# Patient Record
(demographics unavailable — no encounter records)

---

## 2024-11-04 NOTE — PHYSICAL EXAM
[No Acute Distress] : no acute distress [PERRL] : pupils equal round and reactive to light [Normal TMs] : both tympanic membranes were normal [Supple] : supple [Clear to Auscultation] : lungs were clear to auscultation bilaterally [Regular Rhythm] : with a regular rhythm [No Edema] : there was no peripheral edema [Normal] : soft, non-tender, non-distended, no masses palpated, no HSM and normal bowel sounds [Normal Supraclavicular Nodes] : no supraclavicular lymphadenopathy [Normal Anterior Cervical Nodes] : no anterior cervical lymphadenopathy [de-identified] : left  wrist pain at base left thumb

## 2024-11-04 NOTE — HISTORY OF PRESENT ILLNESS
[FreeTextEntry1] : pt here for management of migraines and asthma  [de-identified] : pt had perforation of small intestine 5/22 also has lingula nodule pt  does not want to take statin taking cholestsure

## 2024-11-04 NOTE — ASSESSMENT
[FreeTextEntry1] : injection of depomedrol 1st left mp joing bp acceptable continue valsartan hld continue atorvastatin repeat ct of ches f/u on lungnodule lab evaluation

## 2025-02-17 NOTE — HISTORY OF PRESENT ILLNESS
[FreeTextEntry1] : pt here for management of anxiety and copd  migraines neuropathy  [de-identified] : pt very stressed just bought house has financial issues still working no migraines pt also c/o pain in base of left thumb

## 2025-02-17 NOTE — ASSESSMENT
[FreeTextEntry1] : polyneuropathy continue tramadol anxiety continue xanax increase [id number] mg tid for polyneuropathy hld continue atorvastatin

## 2025-02-17 NOTE — PHYSICAL EXAM
[No Acute Distress] : no acute distress [PERRL] : pupils equal round and reactive to light [Normal TMs] : both tympanic membranes were normal [Supple] : supple [Clear to Auscultation] : lungs were clear to auscultation bilaterally [Regular Rhythm] : with a regular rhythm [No Edema] : there was no peripheral edema [Normal] : soft, non-tender, non-distended, no masses palpated, no HSM and normal bowel sounds [Normal Supraclavicular Nodes] : no supraclavicular lymphadenopathy [Normal Anterior Cervical Nodes] : no anterior cervical lymphadenopathy [de-identified] : left  wrist pain at base left thumb

## 2025-02-20 NOTE — COUNSELING
[Nutrition/ Exercise/ Weight Management] : nutrition, exercise, weight management [Vitamins/Supplements] : vitamins/supplements [Breast Self Exam] : breast self exam [FreeTextEntry2] : Recommend vitamin D; adequate calcium intake, weight bearing exercises Fall precautions and fracture prevention discussed.

## 2025-02-20 NOTE — PHYSICAL EXAM
[Appropriately responsive] : appropriately responsive [Alert] : alert [No Acute Distress] : no acute distress [No Lymphadenopathy] : no lymphadenopathy [Regular Rate Rhythm] : regular rate rhythm [No Murmurs] : no murmurs [Soft] : soft [Non-tender] : non-tender [Non-distended] : non-distended [No HSM] : No HSM [No Lesions] : no lesions [No Mass] : no mass [Oriented x3] : oriented x3 [FreeTextEntry5] : Respirations even, unlabored. no dyspnea.  [FreeTextEntry6] : dense tissue bilaterally symmetric bilaterally, no palpable masses, no skin changes or dimpling, no nipple discharge, no adenopathy [FreeTextEntry1] : Labia/Clitoris: Normal atrophy, no lesions. Vagina: atrophic, no lesions visible or palpable. no abnormal discharge Cervix: Smooth, pink, no lesions. No cervical motion tenderness. PAP collected Uterus: normal size, mid-position, mobile, nontender. Adnexa: No palpable adnexal masses, nontender bilaterally.

## 2025-02-20 NOTE — HISTORY OF PRESENT ILLNESS
[Patient reported mammogram was normal] : Patient reported mammogram was normal [Patient reported PAP Smear was normal] : Patient reported PAP Smear was normal [Patient reported bone density results were normal] : Patient reported bone density results were normal [Patient reported colonoscopy was normal] : Patient reported colonoscopy was normal [LMP unknown] : LMP unknown [Y] : Positive pregnancy history [postmenopausal] : postmenopausal [N] : Patient is not sexually active [FreeTextEntry1] : 64-year-old female is here as a new patient for a routine gynecological exam. Denies PMB, pelvic or acute breast concerns.  [Mammogramdate] : 5/2024 [PapSmeardate] : 1996 [BoneDensityDate] : 02/2023 [TextBox_37] : osteopenia; managed by PCP [ColonoscopyDate] : 09/2022 [LMPDate] : ~ age 45yr [PGHxTotal] : 2 [[facility]] : 1 [PGHxAbortions] : 1 [[facility]] : 1

## 2025-02-20 NOTE — DISCUSSION/SUMMARY
[FreeTextEntry1] : Well appearing  female is here for gyn exam, no acute gyn complaints. no PMB. Hx of osteopenia, sent for Dexa by PCP. reviewed bone health.  - PAP done with co-testing (pt has not had pap in over 25 years) - UTD with mammogram - UTD with colon cancer screening - Healthy diet and routine weight bearing exercises recommended -SBE advised   RTO in 1 year for Annual gyn exam and PRN

## 2025-02-20 NOTE — HISTORY OF PRESENT ILLNESS
[Patient reported mammogram was normal] : Patient reported mammogram was normal [Patient reported PAP Smear was normal] : Patient reported PAP Smear was normal [Patient reported bone density results were normal] : Patient reported bone density results were normal [Patient reported colonoscopy was normal] : Patient reported colonoscopy was normal [LMP unknown] : LMP unknown [Y] : Positive pregnancy history [postmenopausal] : postmenopausal [N] : Patient is not sexually active [FreeTextEntry1] : 64-year-old female is here as a new patient for a routine gynecological exam. Denies PMB, pelvic or acute breast concerns.  [Mammogramdate] : 5/2024 [PapSmeardate] : 1996 [BoneDensityDate] : 02/2023 [TextBox_37] : osteopenia; managed by PCP [ColonoscopyDate] : 09/2022 [LMPDate] : ~ age 45yr [PGHxTotal] : 2 [[facility] Center[facility]] : 1 [PGHxAbortions] : 1 [[facility] Centeriving] : 1 no

## 2025-05-20 NOTE — HISTORY OF PRESENT ILLNESS
[FreeTextEntry1] : annual wellness visit [de-identified] : Pt is a 61 yo female here for annual wellness visit. Pt works in [employer] and comes to [place] often. Pt is having stress and anxiety about money and moving back and forth; there is a lot going on. 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 Pt in 05/22 hospitalized in [place] with diverticulitis, had colostomy for months, then removed colostomy. Couldn't go back to work for a while (10 months). Pt is suposed to have surgery on her lung but she can not afford to be out of work and live on only savings. She states her breathing is fine. Pt having trigger finger of left thumb, was wondering about getting another cortisone injection (got one of her right thumb years ago and has helped tremendously). PMH: HTN, asthma, diverticulosis, anxiety/insomnia, neuropathy, chronic pain on tramadol, pulm nodule + endobronchial mass Consults: pulmonology, colorectal  tried to decrease  tramadol to tid and increase gabapentin but pain got worse pt also c/o  rt thumb pain

## 2025-05-20 NOTE — PHYSICAL EXAM
[No Acute Distress] : no acute distress [PERRL] : pupils equal round and reactive to light [Normal TMs] : both tympanic membranes were normal [Supple] : supple [Clear to Auscultation] : lungs were clear to auscultation bilaterally [Regular Rhythm] : with a regular rhythm [No Edema] : there was no peripheral edema [Normal] : soft, non-tender, non-distended, no masses palpated, no HSM and normal bowel sounds [Normal Supraclavicular Nodes] : no supraclavicular lymphadenopathy [Normal Anterior Cervical Nodes] : no anterior cervical lymphadenopathy [de-identified] : left  wrist pain at base left thumb

## 2025-05-20 NOTE — HEALTH RISK ASSESSMENT
[Very Good] : ~his/her~  mood as very good [Never (0 pts)] : Never (0 points) [No] : In the past 12 months have you used drugs other than those required for medical reasons? No [Little interest or pleasure doing things] : 1) Little interest or pleasure doing things [Feeling down, depressed, or hopeless] : 2) Feeling down, depressed, or hopeless [0] : 2) Feeling down, depressed, or hopeless: Not at all (0) [PHQ-2 Negative - No further assessment needed] : PHQ-2 Negative - No further assessment needed [Former] : Former [> 15 Years] : > 15 Years [Patient declined Retinal Exam] : Patient declined Retinal Exam. [Patient reported mammogram was normal] : Patient reported mammogram was normal [Patient reported bone density results were normal] : Patient reported bone density results were normal [Patient reported colonoscopy was normal] : Patient reported colonoscopy was normal [None] : None [With Family] : lives with family [# of Members in Household ___] :  household currently consist of [unfilled] member(s) [Employed] : employed [College] : College [Single] : single [# Of Children ___] : has [unfilled] children [Feels Safe at Home] : Feels safe at home [Fully functional (bathing, dressing, toileting, transferring, walking, feeding)] : Fully functional (bathing, dressing, toileting, transferring, walking, feeding) [Fully functional (using the telephone, shopping, preparing meals, housekeeping, doing laundry, using] : Fully functional and needs no help or supervision to perform IADLs (using the telephone, shopping, preparing meals, housekeeping, doing laundry, using transportation, managing medications and managing finances) [Reports normal functional visual acuity (ie: able to read med bottle)] : Reports normal functional visual acuity [Smoke Detector] : smoke detector [Carbon Monoxide Detector] : carbon monoxide detector [Safety elements used in home] : safety elements used in home [Seat Belt] :  uses seat belt [Sunscreen] : uses sunscreen [With Patient/Caregiver] : , with patient/caregiver [Designated Healthcare Proxy] : Designated healthcare proxy [Name: ___] : Health Care Proxy's Name: [unfilled]  [Relationship: ___] : Relationship: [unfilled] [Aggressive treatment] : aggressive treatment [Time Spent: ___ minutes] : Time Spent: [unfilled] minutes [de-identified] : no [Audit-CScore] : 0 [de-identified] : none [[id number]] : 0 [de-identified] : quit 27 years ago, 20 years 1/2 pack day = 10 year pack hx [Change in mental status noted] : No change in mental status noted [Language] : denies difficulty with language [Behavior] : denies difficulty with behavior [Learning/Retaining New Information] : denies difficulty learning/retaining new information [Handling Complex Tasks] : denies difficulty handling complex tasks [Reasoning] : denies difficulty with reasoning [Spatial Ability and Orientation] : denies difficulty with spatial ability and orientation [Sexually Active] : not sexually active [High Risk Behavior] : no high risk behavior [Reports changes in hearing] : Reports no changes in hearing [Reports changes in vision] : Reports no changes in vision [Reports changes in dental health] : Reports no changes in dental health [Travel to Developing Areas] : does not  travel to developing areas [TB Exposure] : is not being exposed to tuberculosis [Caregiver Concerns] : does not have caregiver concerns [MammogramDate] : 5/24 [BoneDensityDate] : 02/15/2023 [BoneDensityComments] : osteopenia [ColonoscopyDate] : 05/2023 [ColonoscopyComments] : no polyps [de-identified] : sister and brother in law in [place], lives with daughter when in [place] [FreeTextEntry2] :  and  at [employer] [de-identified] :  [AdvancecareDate] : 5/25 [FreeTextEntry4] : also sister if down in [place] - [place] 16 minutes  spent discussing  end of life care and options for treatment such as, a DNR, DNI, dialysis, tube feeds and if patient becomes unable to make decisions for self and has incurable disease that treatment would not benefit patient .  discussed options such as DNI DNR  TUBE FEEDSINGS AND DIALYSIS AND BENEFIT AND POSSIBLE OUT COMES OF SUCH TREATMMENTS.  PT UNDERSTANDS ALL OPTIONS AND ALL QUESTIONS WERE ADDRESSED

## 2025-05-20 NOTE — ASSESSMENT
[Vaccines Reviewed] : Immunizations reviewed today. Please see immunization details in the vaccine log within the immunization flowsheet.  [FreeTextEntry1] : left thumb oa injected base with depomedrol  hld continue atorvastatin  anxiety renew xanax bp acceptable continue valsartan migraines  stable